# Patient Record
Sex: FEMALE | Race: WHITE | NOT HISPANIC OR LATINO | URBAN - METROPOLITAN AREA
[De-identification: names, ages, dates, MRNs, and addresses within clinical notes are randomized per-mention and may not be internally consistent; named-entity substitution may affect disease eponyms.]

---

## 2017-08-16 ASSESSMENT — MIFFLIN-ST. JEOR: SCORE: 1347.51

## 2017-08-29 ASSESSMENT — MIFFLIN-ST. JEOR: SCORE: 1351.48

## 2017-08-31 ENCOUNTER — ANESTHESIA - HEALTHEAST (OUTPATIENT)
Dept: SURGERY | Facility: CLINIC | Age: 62
End: 2017-08-31

## 2017-09-01 ENCOUNTER — SURGERY - HEALTHEAST (OUTPATIENT)
Dept: SURGERY | Facility: CLINIC | Age: 62
End: 2017-09-01

## 2017-09-01 ASSESSMENT — MIFFLIN-ST. JEOR: SCORE: 1346.94

## 2017-09-02 ASSESSMENT — MIFFLIN-ST. JEOR: SCORE: 1369.9

## 2021-05-31 VITALS — WEIGHT: 191.06 LBS | BODY MASS INDEX: 35.16 KG/M2 | HEIGHT: 62 IN

## 2021-06-12 NOTE — ANESTHESIA CARE TRANSFER NOTE
Last vitals:   Vitals:    09/01/17 1006   BP: (P) 137/69   Pulse: (P) 82   Resp: (P) 16   Temp: (P) 37.4  C (99.3  F)   SpO2: (P) 99%     Patient's level of consciousness is drowsy  Spontaneous respirations: yes  Maintains airway independently: yes  Dentition unchanged: yes  Oropharynx: oropharynx clear of all foreign objects    QCDR Measures:  ASA# 20 - Surgical Safety Checklist: WHO surgical safety checklist completed prior to induction  PQRS# 430 - Adult PONV Prevention: 4558F - Pt received => 2 anti-emetic agents (different classes) preop & intraop  ASA# 8 - Peds PONV Prevention: NA - Not pediatric patient, not GA or 2 or more risk factors NOT present  PQRS# 424 - [facility]ierra-op Temp Management: 4559F - At least one body temp DOCUMENTED => 35.5C or 95.9F within required timeframe  PQRS# 426 - PACU Transfer Protocol: - Transfer of care checklist used  ASA# 14 - Acute Post-op Pain: ASA14B - Patient did NOT experience pain >= 7 out of 10

## 2021-06-12 NOTE — ANESTHESIA PROCEDURE NOTES
Spinal Block    Patient location during procedure: OR  Start time: 9/1/2017 7:51 AM  End time: 9/1/2017 7:56 AM  Reason for block: primary anesthetic    Staffing:  Performing  Anesthesiologist: MICHELE YOUNG    Preanesthetic Checklist  Completed: patient identified, risks, benefits, and alternatives discussed, timeout performed, consent obtained, airway assessed, oxygen available, suction available, emergency drugs available and hand hygiene performed  Spinal Block  Patient position: sitting  Prep: Betadine  Patient monitoring: heart rate, continuous pulse ox and blood pressure  Approach: midline  Location: L2-3  Injection technique: single-shot  Needle type: pencil-tip   Needle gauge: 24 G

## 2021-06-12 NOTE — ANESTHESIA POSTPROCEDURE EVALUATION
Patient: Jessica Chandler  #1 VAGINAL HYSTERECTOMY, POSTERIOR REPAIR  Anesthesia type: spinal    Patient location: PACU  Last vitals:   Vitals:    09/01/17 1330   BP: 174/67   Pulse: 80   Resp: 16   Temp: 36.7  C (98.1  F)   SpO2: 94%     Post vital signs: stable  Level of consciousness: awake and responds to simple questions  Post-anesthesia pain: pain controlled  Post-anesthesia nausea and vomiting: no  Pulmonary: unassisted, spontaneous ventilation, nasal cannula  Cardiovascular: stable and blood pressure at baseline  Hydration: adequate  Anesthetic events: no    QCDR Measures:  ASA# 11 - [facility]-op Cardiac Arrest: ASA11B - Patient did NOT experience unanticipated cardiac arrest  ASA# 12 - [facility]-op Mortality Rate: ASA12B - Patient did NOT die  ASA# 13 - PACU Re-Intubation Rate: NA - No ETT / LMA used for case  ASA# 10 - Composite Anes Safety: ASA10A - No serious adverse event    Additional Notes:

## 2021-06-12 NOTE — ANESTHESIA PREPROCEDURE EVALUATION
Anesthesia Evaluation      Patient summary reviewed   History of anesthetic complications (PONV)     Airway   Mallampati: I  Neck ROM: full   Pulmonary - normal exam    breath sounds clear to auscultation  (+) asthma  mild,  well controlled,                          Cardiovascular - normal exam  (+) hypertension well controlled, , hypercholesterolemia,     ECG reviewed (NSR)  Rhythm: regular  Rate: normal,         Neuro/Psych      Comments: Peripheral neuropathy    Endo/Other    (+) hypothyroidism, obesity (BMI 34),      GI/Hepatic/Renal - negative ROS           Dental - normal exam                        Anesthesia Plan  Planned anesthetic: spinal    ASA 2     Anesthetic plan and risks discussed with: patient